# Patient Record
Sex: MALE | Race: WHITE | NOT HISPANIC OR LATINO
[De-identification: names, ages, dates, MRNs, and addresses within clinical notes are randomized per-mention and may not be internally consistent; named-entity substitution may affect disease eponyms.]

---

## 2017-04-10 ENCOUNTER — RECORDS - HEALTHEAST (OUTPATIENT)
Dept: ADMINISTRATIVE | Facility: OTHER | Age: 67
End: 2017-04-10

## 2017-04-14 ASSESSMENT — MIFFLIN-ST. JEOR: SCORE: 1805.92

## 2017-04-18 ENCOUNTER — ANESTHESIA - HEALTHEAST (OUTPATIENT)
Dept: SURGERY | Facility: CLINIC | Age: 67
End: 2017-04-18

## 2017-04-19 ENCOUNTER — SURGERY - HEALTHEAST (OUTPATIENT)
Dept: SURGERY | Facility: CLINIC | Age: 67
End: 2017-04-19

## 2017-04-19 ASSESSMENT — MIFFLIN-ST. JEOR: SCORE: 1805.92

## 2017-04-21 ASSESSMENT — MIFFLIN-ST. JEOR: SCORE: 1832.68

## 2017-04-22 ASSESSMENT — MIFFLIN-ST. JEOR: SCORE: 1846.29

## 2017-04-25 ENCOUNTER — COMMUNICATION - HEALTHEAST (OUTPATIENT)
Dept: SCHEDULING | Facility: CLINIC | Age: 67
End: 2017-04-25

## 2021-05-30 VITALS — HEIGHT: 70 IN | WEIGHT: 235.9 LBS | BODY MASS INDEX: 33.77 KG/M2

## 2021-06-10 NOTE — ANESTHESIA CARE TRANSFER NOTE
Last vitals:   Vitals:    04/19/17 0649   BP: 144/90   Pulse: 90   Resp: 16   Temp: 36.7  C (98  F)   SpO2: 93%     Patient's level of consciousness is awake.  Spontaneous respirations: yes  Maintains airway independently:yes  Dentition unchanged: yes  Oropharynx: oropharynx clear of all foreign objects    QCDR Measures:  ASA# 20 - Surgical Safety Checklist: ASA20A - Safety Checks Done  PQRS# 430 - Adult PONV Prevention: 4558F - Pt received => 2 anti-emetic agents (different classes) preop & intraop  ASA# 8 - Peds PONV Prevention: NA - Not pediatric patient, not GA or 2 or more risk factors NOT present  PQRS# 424 - [place]-op Temp Management: 4559F - At least one body temp DOCUMENTED => 35.5C or 95.9F within required timeframe  PQRS# 426 - PACU Transfer Protocol: - Transfer of care checklist used  ASA# 14 - Acute Post-op Pain: ASA14B - Patient did NOT experience pain >= 7 out of 10    I completed my SBAR handoff to the receiving nurse per policy and procedure.

## 2021-06-10 NOTE — ANESTHESIA POSTPROCEDURE EVALUATION
Patient: Judd Marsh  BILATERAL L4-5 L5-S1 ANTERIOR SPINAL FUSION WITH PLATING and iliac crest aspirate, EXPOSURE AND CLOSURE, ANTERIOR APPROACH, FOR SPINAL SURGERY, BY GENERAL SURGERY  Anesthesia type: general    Patient location: PACU  Last vitals:   Vitals:    04/19/17 1400   BP: 115/79   Pulse: (!) 110   Resp: 17   Temp: 36.8  C (98.2  F)   SpO2: 93%     Post vital signs: stable  Level of consciousness: awake and responds to simple questions  Post-anesthesia pain: pain controlled  Post-anesthesia nausea and vomiting: no  Pulmonary: unassisted, return to baseline  Cardiovascular: stable and blood pressure at baseline  Hydration: adequate  Anesthetic events: no    QCDR Measures:  ASA# 11 - [place]-op Cardiac Arrest: ASA11B - Patient did NOT experience unanticipated cardiac arrest  ASA# 12 - [place]-op Mortality Rate: ASA12B - Patient did NOT die  ASA# 13 - PACU Re-Intubation Rate: ASA13B - Patient did NOT require a new airway mgmt  ASA# 10 - Composite Anes Safety: ASA10A - No serious adverse event  ASA# 38 - New Corneal Injury: ASA38A - No new exposure keratitis or corneal abrasion in PACU    Additional Notes:

## 2021-06-10 NOTE — ANESTHESIA PREPROCEDURE EVALUATION
Anesthesia Evaluation      Patient summary reviewed   No history of anesthetic complications     Airway   Mallampati: II  Neck ROM: full   Pulmonary - normal exam    breath sounds clear to auscultation  (+) sleep apnea on no CPAP, mild, a smoker (Remote)                         Cardiovascular - normal exam  (+) hypertension well controlled, , hypercholesterolemia,     ECG reviewed (NSR)  Rhythm: regular  Rate: normal,         Neuro/Psych    (+) neuromuscular disease,      Endo/Other    (+) diabetes mellitus type 2 well controlled, hypothyroidism, obesity (BMI 33),      GI/Hepatic/Renal    (+) GERD well controlled and intermittent,             Dental - normal exam   (+) bridge                       Anesthesia Plan  Planned anesthetic: general endotracheal    ASA 3   Induction: intravenous   Anesthetic plan and risks discussed with: patient    Post-op plan: routine recovery